# Patient Record
Sex: MALE | URBAN - NONMETROPOLITAN AREA
[De-identification: names, ages, dates, MRNs, and addresses within clinical notes are randomized per-mention and may not be internally consistent; named-entity substitution may affect disease eponyms.]

---

## 2022-03-25 ENCOUNTER — APPOINTMENT (RX ONLY)
Dept: URBAN - NONMETROPOLITAN AREA CLINIC 13 | Facility: CLINIC | Age: 47
Setting detail: DERMATOLOGY
End: 2022-03-25

## 2022-03-25 DIAGNOSIS — L64.8 OTHER ANDROGENIC ALOPECIA: ICD-10-CM | Status: STABLE

## 2022-03-25 PROCEDURE — ? COUNSELING

## 2022-03-25 PROCEDURE — ? COSMETIC CONSULTATION: PRP

## 2022-03-25 ASSESSMENT — LOCATION ZONE DERM: LOCATION ZONE: SCALP

## 2022-03-25 ASSESSMENT — LOCATION SIMPLE DESCRIPTION DERM: LOCATION SIMPLE: LEFT SCALP

## 2022-03-25 ASSESSMENT — LOCATION DETAILED DESCRIPTION DERM: LOCATION DETAILED: LEFT MEDIAL FRONTAL SCALP

## 2023-02-23 ENCOUNTER — APPOINTMENT (RX ONLY)
Dept: URBAN - NONMETROPOLITAN AREA CLINIC 13 | Facility: CLINIC | Age: 48
Setting detail: DERMATOLOGY
End: 2023-02-23

## 2023-02-23 DIAGNOSIS — L64.8 OTHER ANDROGENIC ALOPECIA: ICD-10-CM

## 2023-02-23 PROCEDURE — ? ADDITIONAL NOTES

## 2023-02-23 PROCEDURE — ? COUNSELING

## 2023-02-23 ASSESSMENT — LOCATION SIMPLE DESCRIPTION DERM: LOCATION SIMPLE: ANTERIOR SCALP

## 2023-02-23 ASSESSMENT — LOCATION ZONE DERM: LOCATION ZONE: SCALP

## 2023-02-23 ASSESSMENT — LOCATION DETAILED DESCRIPTION DERM: LOCATION DETAILED: MID-FRONTAL SCALP

## 2023-02-23 NOTE — PROCEDURE: ADDITIONAL NOTES
Detail Level: Detailed
Additional Notes: Pt is 4 months s/p hair transplant for androgenic alopecia. He presents today for PRP, but was counseled to reschedule due to current UTI infection and antibiotic tx. Recommend 4 tx over 6 month time frame, which pt will schedule.
Render Risk Assessment In Note?: no

## 2023-02-23 NOTE — HPI: HAIR LOSS
How Did The Hair Loss Occur?: gradual in onset
How Severe Is Your Hair Loss?: mild
Additional History: Pt presents for PRPE. Transplant occurred in October in 2022.

## 2023-03-16 ENCOUNTER — APPOINTMENT (RX ONLY)
Dept: URBAN - NONMETROPOLITAN AREA CLINIC 13 | Facility: CLINIC | Age: 48
Setting detail: DERMATOLOGY
End: 2023-03-16

## 2023-03-16 DIAGNOSIS — L64.8 OTHER ANDROGENIC ALOPECIA: ICD-10-CM

## 2023-03-16 PROCEDURE — ? COUNSELING

## 2023-03-16 PROCEDURE — ? PRESCRIPTION MEDICATION MANAGEMENT

## 2023-03-16 PROCEDURE — ? PLATELET RICH PLASMA INJECTION

## 2023-03-16 ASSESSMENT — LOCATION DETAILED DESCRIPTION DERM: LOCATION DETAILED: MID-FRONTAL SCALP

## 2023-03-16 ASSESSMENT — LOCATION ZONE DERM: LOCATION ZONE: SCALP

## 2023-03-16 ASSESSMENT — LOCATION SIMPLE DESCRIPTION DERM: LOCATION SIMPLE: ANTERIOR SCALP

## 2023-03-16 NOTE — HPI: HAIR LOSS
How Did The Hair Loss Occur?: gradual in onset
How Severe Is Your Hair Loss?: moderate
Additional History: Presents for PRP.

## 2023-03-16 NOTE — PROCEDURE: PLATELET RICH PLASMA INJECTION
Depth In Mm (Will Not Render If 0): 0
Location #2: Frontal, Temporal and Superior scalp
Standard Default Technique For Making Prp: PRP used as a glide and applied post procedure.
Consent: Written consent obtained, risks reviewed including but not limited to pain, scarring, infection and incomplete improvement.  Patient understands the procedure is cosmetic in nature and will require out of pocket payment.
Technique 2: PRP used as a glide and applied post microneedling procedure.
Comments: Pt had hair transplant in [place] a few months ago and has healed well. Recommend topical male hair restore. Will consider oral meds if not improving.
Treatment Number (Optional): 1
Topical Anesthesia Type: ice and vibration
Anesthesia Type: 1% lidocaine with epinephrine
Show Additional Techniques: No
Amount Of Blood Collected (Optional - Include Units): 15ml
Amount Of Blood Processed (Optional - Include Units): 11
Detail Level: Zone
Amount Injected At This Location In Cc (Will Not Render If 0): 6
External Cooling Fan Speed: 5
Which Technique?: Default
Site Of Venipuncture?: Lt antecubital

## 2023-03-16 NOTE — PROCEDURE: PRESCRIPTION MEDICATION MANAGEMENT
Initiate Treatment: Male hair restore treatment 3 nights per week with dermaroller.
Render In Strict Bullet Format?: No
Detail Level: Simple

## 2023-06-01 ENCOUNTER — APPOINTMENT (RX ONLY)
Dept: URBAN - NONMETROPOLITAN AREA CLINIC 13 | Facility: CLINIC | Age: 48
Setting detail: DERMATOLOGY
End: 2023-06-01

## 2023-06-01 DIAGNOSIS — L64.8 OTHER ANDROGENIC ALOPECIA: ICD-10-CM

## 2023-06-01 PROCEDURE — ? PLATELET RICH PLASMA INJECTION

## 2023-06-01 PROCEDURE — ? COUNSELING

## 2023-06-01 ASSESSMENT — LOCATION DETAILED DESCRIPTION DERM: LOCATION DETAILED: MID-FRONTAL SCALP

## 2023-06-01 ASSESSMENT — LOCATION ZONE DERM: LOCATION ZONE: SCALP

## 2023-06-01 ASSESSMENT — LOCATION SIMPLE DESCRIPTION DERM: LOCATION SIMPLE: ANTERIOR SCALP

## 2023-06-01 NOTE — PROCEDURE: PLATELET RICH PLASMA INJECTION
Depth In Mm (Will Not Render If 0): 0
Location #2: Frontal, Temporal and Superior scalp
Standard Default Technique For Making Prp: PRP used as a glide and applied post procedure.
Consent: Written consent obtained, risks reviewed including but not limited to pain, scarring, infection and incomplete improvement.  Patient understands the procedure is cosmetic in nature and will require out of pocket payment.
Technique 2: PRP used as a glide and applied post microneedling procedure.
Comments: Pt had hair transplant in [place] a few months ago and has healed well. Recommend topical male hair restore, but he declines rx. He is using a product he bought in [facility]. Pt tolerated 1st tx well and new fine hair growth is noted on exam. Will consider oral meds if not improving.
Treatment Number (Optional): 2
Topical Anesthesia Type: ice and vibration
Anesthesia Type: 1% lidocaine with epinephrine
Show Additional Techniques: No
Amount Of Blood Collected (Optional - Include Units): 15ml
Amount Of Blood Processed (Optional - Include Units): 10
Detail Level: Zone
External Cooling Fan Speed: 5
Which Technique?: Default
Amount Injected At This Location In Cc (Will Not Render If 0): 0.6
Site Of Venipuncture?: Lt antecubital

## 2023-07-06 ENCOUNTER — APPOINTMENT (RX ONLY)
Dept: URBAN - NONMETROPOLITAN AREA CLINIC 13 | Facility: CLINIC | Age: 48
Setting detail: DERMATOLOGY
End: 2023-07-06

## 2023-07-06 DIAGNOSIS — L64.8 OTHER ANDROGENIC ALOPECIA: ICD-10-CM

## 2023-07-06 PROCEDURE — ? PLATELET RICH PLASMA INJECTION

## 2023-07-06 PROCEDURE — ? COUNSELING

## 2023-07-06 PROCEDURE — ? PRESCRIPTION MEDICATION MANAGEMENT

## 2023-07-06 ASSESSMENT — LOCATION SIMPLE DESCRIPTION DERM: LOCATION SIMPLE: ANTERIOR SCALP

## 2023-07-06 ASSESSMENT — LOCATION ZONE DERM: LOCATION ZONE: SCALP

## 2023-07-06 ASSESSMENT — LOCATION DETAILED DESCRIPTION DERM: LOCATION DETAILED: MID-FRONTAL SCALP

## 2023-07-06 NOTE — PROCEDURE: PLATELET RICH PLASMA INJECTION
Depth In Mm (Will Not Render If 0): 0
Location #2: Frontal, Temporal and Superior scalp
Standard Default Technique For Making Prp: PRP injected q 1-1.5cm on frontal, temporal and superior scalp. Ice and vibration used for comfort/distraction.
Who Performed The Venipuncture?: KB
Consent: Written consent obtained, risks reviewed including but not limited to pain, scarring, infection and incomplete improvement.  Patient understands the procedure is cosmetic in nature and will require out of pocket payment.
Post-Care Instructions: Pt should avoid exercising for 8 hours. Can resume normal hair care tomorrow.
Technique 2: PRP used as a glide and applied post microneedling procedure.
Comments: Pt had hair transplant in [place] a few months ago and has healed well. He is using a product he bought in [facility]. Pt tolerated 1st tx well and new fine hair growth is noted on exam.
Treatment Number (Optional): 3
Topical Anesthesia Type: ice and vibration
Anesthesia Type: 1% lidocaine with epinephrine
Show Additional Techniques: No
Number Of Tubes Drawn: 1
Amount Of Blood Collected (Optional - Include Units): 15ml
Amount Of Blood Processed (Optional - Include Units): 10
Detail Level: Zone
External Cooling Fan Speed: 5
Which Technique?: Default
Amount Injected At This Location In Cc (Will Not Render If 0): 6
Site Of Venipuncture?: Rt antecubital

## 2023-07-06 NOTE — PROCEDURE: PRESCRIPTION MEDICATION MANAGEMENT
Continue Regimen: Male Hair Restore 3 nights weekly with dermaroller
Plan: Pt has noticed new hair is fine. He is approx 9 months post hair transplant. He does not want to be on any oral meds. Advised pt that Male Hair Restore soln is not related to headaches.
Render In Strict Bullet Format?: No
Detail Level: Simple

## 2023-10-05 ENCOUNTER — APPOINTMENT (RX ONLY)
Dept: URBAN - NONMETROPOLITAN AREA CLINIC 13 | Facility: CLINIC | Age: 48
Setting detail: DERMATOLOGY
End: 2023-10-05

## 2023-10-05 DIAGNOSIS — L64.8 OTHER ANDROGENIC ALOPECIA: ICD-10-CM

## 2023-10-05 PROCEDURE — ? PLATELET RICH PLASMA INJECTION

## 2023-10-05 PROCEDURE — ? COUNSELING

## 2023-10-05 PROCEDURE — ? PRESCRIPTION MEDICATION MANAGEMENT

## 2023-10-05 ASSESSMENT — LOCATION DETAILED DESCRIPTION DERM: LOCATION DETAILED: MID-FRONTAL SCALP

## 2023-10-05 ASSESSMENT — LOCATION SIMPLE DESCRIPTION DERM: LOCATION SIMPLE: ANTERIOR SCALP

## 2023-10-05 ASSESSMENT — LOCATION ZONE DERM: LOCATION ZONE: SCALP

## 2023-10-05 NOTE — PROCEDURE: PLATELET RICH PLASMA INJECTION
Depth In Mm (Will Not Render If 0): 0
Location #2: Frontal, Temporal and Superior scalp
Standard Default Technique For Making Prp: PRP injected q 1-1.5cm on frontal, temporal and superior scalp. Ice and vibration used for comfort/distraction.
Who Performed The Venipuncture?: KB
Consent: Written consent obtained, risks reviewed including but not limited to pain, scarring, infection and incomplete improvement.  Patient understands the procedure is cosmetic in nature and will require out of pocket payment.
Post-Care Instructions: Pt should avoid exercising for 8 hours. Can resume normal hair care tomorrow.
Technique 2: PRP used as a glide and applied post microneedling procedure.
Comments: Pt had hair transplant in [place] approx 9 months ago and has healed well. He is using a product he bought in [facility], a supplement for hair nutrition. Pt tolerated treatments well and new fine hair growth is noted on exam.
Treatment Number (Optional): 4
Topical Anesthesia Type: ice and vibration
Anesthesia Type: 1% lidocaine with epinephrine
Show Additional Techniques: No
Number Of Tubes Drawn: 1
Amount Of Blood Collected (Optional - Include Units): 15ml
Amount Of Blood Processed (Optional - Include Units): 10
Detail Level: Zone
External Cooling Fan Speed: 5
Which Technique?: Default
Amount Injected At This Location In Cc (Will Not Render If 0): 6
Site Of Venipuncture?: Rt antecubital

## 2023-10-05 NOTE — PROCEDURE: PRESCRIPTION MEDICATION MANAGEMENT
Continue Regimen: Male Hair Restore 3 nights weekly with dermaroller
Plan: Pt has noticed new hair is fine. He is approx 9 months post hair transplant. He does not want to be on any oral prescription meds. He does use some supplements from [place] for hair nutrition.
Render In Strict Bullet Format?: No
Detail Level: Simple